# Patient Record
Sex: MALE | Race: WHITE | NOT HISPANIC OR LATINO | URBAN - METROPOLITAN AREA
[De-identification: names, ages, dates, MRNs, and addresses within clinical notes are randomized per-mention and may not be internally consistent; named-entity substitution may affect disease eponyms.]

---

## 2017-10-30 ENCOUNTER — OFFICE VISIT - RIVER FALLS (OUTPATIENT)
Dept: FAMILY MEDICINE | Facility: CLINIC | Age: 32
End: 2017-10-30

## 2017-11-16 ENCOUNTER — OFFICE VISIT - RIVER FALLS (OUTPATIENT)
Dept: FAMILY MEDICINE | Facility: CLINIC | Age: 32
End: 2017-11-16

## 2017-11-16 ASSESSMENT — MIFFLIN-ST. JEOR: SCORE: 2077.93

## 2018-01-10 ENCOUNTER — OFFICE VISIT - RIVER FALLS (OUTPATIENT)
Dept: FAMILY MEDICINE | Facility: CLINIC | Age: 33
End: 2018-01-10

## 2018-01-10 ASSESSMENT — MIFFLIN-ST. JEOR: SCORE: 2085.19

## 2018-03-14 ENCOUNTER — OFFICE VISIT - RIVER FALLS (OUTPATIENT)
Dept: FAMILY MEDICINE | Facility: CLINIC | Age: 33
End: 2018-03-14

## 2018-04-25 ENCOUNTER — AMBULATORY - RIVER FALLS (OUTPATIENT)
Dept: FAMILY MEDICINE | Facility: CLINIC | Age: 33
End: 2018-04-25

## 2018-05-29 ENCOUNTER — COMMUNICATION - RIVER FALLS (OUTPATIENT)
Dept: FAMILY MEDICINE | Facility: CLINIC | Age: 33
End: 2018-05-29

## 2018-05-29 ENCOUNTER — OFFICE VISIT - RIVER FALLS (OUTPATIENT)
Dept: FAMILY MEDICINE | Facility: CLINIC | Age: 33
End: 2018-05-29

## 2018-07-27 ENCOUNTER — OFFICE VISIT - RIVER FALLS (OUTPATIENT)
Dept: FAMILY MEDICINE | Facility: CLINIC | Age: 33
End: 2018-07-27

## 2018-09-06 ENCOUNTER — OFFICE VISIT - RIVER FALLS (OUTPATIENT)
Dept: FAMILY MEDICINE | Facility: CLINIC | Age: 33
End: 2018-09-06

## 2018-09-06 ASSESSMENT — MIFFLIN-ST. JEOR: SCORE: 2080.31

## 2018-09-12 ENCOUNTER — OFFICE VISIT - RIVER FALLS (OUTPATIENT)
Dept: FAMILY MEDICINE | Facility: CLINIC | Age: 33
End: 2018-09-12

## 2018-09-13 LAB
CHOLEST SERPL-MCNC: 144 MG/DL
CHOLEST/HDLC SERPL: 3.1 {RATIO}
CREAT SERPL-MCNC: 1.08 MG/DL (ref 0.6–1.35)
GLUCOSE BLD-MCNC: 85 MG/DL (ref 65–99)
HDLC SERPL-MCNC: 46 MG/DL
LDLC SERPL CALC-MCNC: 85 MG/DL
NONHDLC SERPL-MCNC: 98 MG/DL
TRIGL SERPL-MCNC: 46 MG/DL

## 2018-09-24 ENCOUNTER — OFFICE VISIT - RIVER FALLS (OUTPATIENT)
Dept: FAMILY MEDICINE | Facility: CLINIC | Age: 33
End: 2018-09-24

## 2018-09-24 ASSESSMENT — MIFFLIN-ST. JEOR: SCORE: 2089.38

## 2018-10-02 ENCOUNTER — AMBULATORY - RIVER FALLS (OUTPATIENT)
Dept: FAMILY MEDICINE | Facility: CLINIC | Age: 33
End: 2018-10-02

## 2018-12-19 ENCOUNTER — OFFICE VISIT - RIVER FALLS (OUTPATIENT)
Dept: FAMILY MEDICINE | Facility: CLINIC | Age: 33
End: 2018-12-19

## 2018-12-19 ASSESSMENT — MIFFLIN-ST. JEOR: SCORE: 2083.94

## 2019-09-18 ENCOUNTER — OFFICE VISIT - RIVER FALLS (OUTPATIENT)
Dept: FAMILY MEDICINE | Facility: CLINIC | Age: 34
End: 2019-09-18

## 2019-09-18 ASSESSMENT — MIFFLIN-ST. JEOR: SCORE: 2106.62

## 2019-09-19 ENCOUNTER — COMMUNICATION - RIVER FALLS (OUTPATIENT)
Dept: FAMILY MEDICINE | Facility: CLINIC | Age: 34
End: 2019-09-19

## 2019-09-19 LAB
BASOPHILS # BLD MANUAL: 43 10*3/UL (ref 0–200)
BASOPHILS NFR BLD MANUAL: 0.7 %
BUN SERPL-MCNC: 20 MG/DL (ref 7–25)
BUN/CREAT RATIO - HISTORICAL: NORMAL (ref 6–22)
CALCIUM SERPL-MCNC: 10 MG/DL (ref 8.6–10.3)
CHLORIDE BLD-SCNC: 104 MMOL/L (ref 98–110)
CHOLEST SERPL-MCNC: 159 MG/DL
CHOLEST/HDLC SERPL: 2.9 {RATIO}
CO2 SERPL-SCNC: 29 MMOL/L (ref 20–32)
CREAT SERPL-MCNC: 1.13 MG/DL (ref 0.6–1.35)
EGFRCR SERPLBLD CKD-EPI 2021: 83 ML/MIN/1.73M2
EOSINOPHIL # BLD MANUAL: 183 10*3/UL (ref 15–500)
EOSINOPHIL NFR BLD MANUAL: 3 %
ERYTHROCYTE [DISTWIDTH] IN BLOOD BY AUTOMATED COUNT: 13.3 % (ref 11–15)
GLUCOSE BLD-MCNC: 83 MG/DL (ref 65–99)
HCT VFR BLD AUTO: 41.8 % (ref 38.5–50)
HDLC SERPL-MCNC: 54 MG/DL
HGB BLD-MCNC: 14.4 GM/DL (ref 13.2–17.1)
LDLC SERPL CALC-MCNC: 91 MG/DL
LYMPHOCYTES # BLD MANUAL: 1757 10*3/UL (ref 850–3900)
LYMPHOCYTES NFR BLD MANUAL: 28.8 %
MCH RBC QN AUTO: 30.6 PG (ref 27–33)
MCHC RBC AUTO-ENTMCNC: 34.4 GM/DL (ref 32–36)
MCV RBC AUTO: 88.9 FL (ref 80–100)
MONOCYTES # BLD MANUAL: 464 10*3/UL (ref 200–950)
MONOCYTES NFR BLD MANUAL: 7.6 %
NEUTROPHILS # BLD MANUAL: 3654 10*3/UL (ref 1500–7800)
NEUTROPHILS NFR BLD MANUAL: 59.9 %
NONHDLC SERPL-MCNC: 105 MG/DL
PLATELET # BLD AUTO: 317 10*3/UL (ref 140–400)
PMV BLD: 11.3 FL (ref 7.5–12.5)
POTASSIUM BLD-SCNC: 4.6 MMOL/L (ref 3.5–5.3)
RBC # BLD AUTO: 4.7 10*6/UL (ref 4.2–5.8)
SODIUM SERPL-SCNC: 140 MMOL/L (ref 135–146)
TRIGL SERPL-MCNC: 62 MG/DL
TSH SERPL DL<=0.005 MIU/L-ACNC: 2.16 MIU/L (ref 0.4–4.5)
WBC # BLD AUTO: 6.1 10*3/UL (ref 3.8–10.8)

## 2020-01-21 ENCOUNTER — COMMUNICATION - RIVER FALLS (OUTPATIENT)
Dept: FAMILY MEDICINE | Facility: CLINIC | Age: 35
End: 2020-01-21

## 2022-02-12 VITALS
HEIGHT: 77 IN | OXYGEN SATURATION: 99 % | OXYGEN SATURATION: 99 % | WEIGHT: 232.8 LBS | DIASTOLIC BLOOD PRESSURE: 88 MMHG | HEART RATE: 71 BPM | HEIGHT: 77 IN | DIASTOLIC BLOOD PRESSURE: 96 MMHG | SYSTOLIC BLOOD PRESSURE: 130 MMHG | SYSTOLIC BLOOD PRESSURE: 146 MMHG | BODY MASS INDEX: 27.25 KG/M2 | BODY MASS INDEX: 27.49 KG/M2 | WEIGHT: 230.8 LBS | HEART RATE: 65 BPM

## 2022-02-12 VITALS
HEART RATE: 57 BPM | WEIGHT: 236.6 LBS | SYSTOLIC BLOOD PRESSURE: 142 MMHG | DIASTOLIC BLOOD PRESSURE: 88 MMHG | HEIGHT: 77 IN | BODY MASS INDEX: 27.94 KG/M2

## 2022-02-12 VITALS
BODY MASS INDEX: 27.28 KG/M2 | SYSTOLIC BLOOD PRESSURE: 132 MMHG | DIASTOLIC BLOOD PRESSURE: 84 MMHG | OXYGEN SATURATION: 97 % | WEIGHT: 231 LBS | HEIGHT: 77 IN | HEART RATE: 77 BPM

## 2022-02-12 VITALS
TEMPERATURE: 97.6 F | BODY MASS INDEX: 27.09 KG/M2 | WEIGHT: 229.4 LBS | HEIGHT: 77 IN | SYSTOLIC BLOOD PRESSURE: 134 MMHG | HEART RATE: 50 BPM | DIASTOLIC BLOOD PRESSURE: 84 MMHG

## 2022-02-12 VITALS
HEIGHT: 77 IN | WEIGHT: 231.6 LBS | BODY MASS INDEX: 27.35 KG/M2 | DIASTOLIC BLOOD PRESSURE: 76 MMHG | HEART RATE: 72 BPM | SYSTOLIC BLOOD PRESSURE: 122 MMHG

## 2022-02-16 NOTE — TELEPHONE ENCOUNTER
---------------------  From: Krzysztof Villatoro MD   To: SULAIMAN MELO    Sent: 9/19/2019 3:27:58 PM CDT  Subject: Patient Message - Results Notification        The cholesterol results are excellent.   Your other results are all OK    Results:  Date Result Name Value Ref Range   9/18/2019 8:59 AM Sodium Level 140 mmol/L (135 - 146)   9/18/2019 8:59 AM Potassium Level 4.6 mmol/L (3.5 - 5.3)   9/18/2019 8:59 AM Chloride Level 104 mmol/L (98 - 110)   9/18/2019 8:59 AM CO2 Level 29 mmol/L (20 - 32)   9/18/2019 8:59 AM Glucose Level 83 mg/dL (65 - 99)   9/18/2019 8:59 AM BUN 20 mg/dL (7 - 25)   9/18/2019 8:59 AM Creatinine Level 1.13 mg/dL (0.60 - 1.35)   9/18/2019 8:59 AM BUN/Creat Ratio NOT APPLICABLE (6 - 22)   9/18/2019 8:59 AM eGFR 83 mL/min/1.73m2 (> OR = 60 - )   9/18/2019 8:59 AM eGFR African American 96 mL/min/1.73m2 (> OR = 60 - )   9/18/2019 8:59 AM Calcium Level 10.0 mg/dL (8.6 - 10.3)   9/18/2019 8:59 AM Cholesterol 159 mg/dL ( - <200)   9/18/2019 8:59 AM Non-HDL Cholesterol 105 ( - <130)   9/18/2019 8:59 AM HDL 54 mg/dL (>40 - )   9/18/2019 8:59 AM Cholesterol/HDL Ratio 2.9 ( - <5.0)   9/18/2019 8:59 AM LDL 91    9/18/2019 8:59 AM Triglyceride 62 mg/dL ( - <150)   9/18/2019 8:59 AM TSH 2.16 mIU/L (0.40 - 4.50)   9/18/2019 8:59 AM WBC 6.1 (3.8 - 10.8)   9/18/2019 8:59 AM RBC 4.70 (4.20 - 5.80)   9/18/2019 8:59 AM Hgb 14.4 gm/dL (13.2 - 17.1)   9/18/2019 8:59 AM Hct 41.8 % (38.5 - 50.0)   9/18/2019 8:59 AM MCV 88.9 fL (80.0 - 100.0)   9/18/2019 8:59 AM MCH 30.6 pg (27.0 - 33.0)   9/18/2019 8:59 AM MCHC 34.4 gm/dL (32.0 - 36.0)   9/18/2019 8:59 AM RDW 13.3 % (11.0 - 15.0)   9/18/2019 8:59 AM Platelet 317 (140 - 400)   9/18/2019 8:59 AM MPV 11.3 fL (7.5 - 12.5)   9/18/2019 8:59 AM Lymphocytes 28.8 %    9/18/2019 8:59 AM Abs Lymphocytes 1,757 (850 - 3,900)   9/18/2019 8:59 AM Neutrophils 59.9 %    9/18/2019 8:59 AM Abs Neutrophils 3,654 (1,500 - 7,800)   9/18/2019 8:59 AM Monocytes 7.6 %    9/18/2019 8:59  AM Abs Monocytes 464 (200 - 950)   9/18/2019 8:59 AM Eosinophils 3.0 %    9/18/2019 8:59 AM Abs Eosinophils 183 (15 - 500)   9/18/2019 8:59 AM Basophils 0.7 %    9/18/2019 8:59 AM Abs Basophils 43 (0 - 200)

## 2022-02-16 NOTE — LETTER
(Inserted Image. Unable to display)     September 09, 2019      SULAIMAN MELO  [address]  [place], [place] [id number]          Dear SULAIMAN,      Thank you for selecting [facility] (previously [facility]) for your healthcare needs.      Our records indicate you are due for the following services:     Annual Physical.      To schedule an appointment or if you have further questions, please contact your primary clinic:   [facility]       [phone]   [facility]       [phone]              [facility]     [phone]      Powered by [employer]    Sincerely,    Sandeep Flood MD

## 2022-02-16 NOTE — TELEPHONE ENCOUNTER
---------------------  From: Magno Toribio   Sent: 9/18/2019 1:57:52 PM CDT  Subject: General Message     Called pt and left message for a call back. ZIM reviewed his CPAP report and everything looks good.  Pt can  his chip at the .

## 2022-02-16 NOTE — NURSING NOTE
Comprehensive Intake Entered On:  9/18/2019 7:59 AM CDT    Performed On:  9/18/2019 7:58 AM CDT by Magno Toribio               Summary   Chief Complaint :   Pt here for annual px.    Weight Measured :   236.6 lb(Converted to: 236 lb 10 oz, 107.32 kg)    Height Measured :   76.75 in(Converted to: 6 ft 5 in, 194.94 cm)    Body Mass Index :   28.24 kg/m2 (HI)    Body Surface Area :   2.41 m2   Systolic Blood Pressure :   142 mmHg (HI)    Diastolic Blood Pressure :   88 mmHg (HI)    Mean Arterial Pressure :   106 mmHg   Peripheral Pulse Rate :   57 bpm (LOW)    HR Method :   Manual   Magno Toribio - 9/18/2019 7:58 AM CDT   Health Status   Allergies Verified? :   Yes   Medication History Verified? :   Yes   Medical History Verified? :   Yes   Pre-Visit Planning Status :   N/A   Magno Toribio - 9/18/2019 7:58 AM CDT   Consents   Consent for Immunization Exchange :   Consent Granted   Consent for Immunizations to Providers :   Consent Granted   Magno Toribio - 9/18/2019 7:58 AM CDT   Meds / Allergies   (As Of: 9/18/2019 7:59:53 AM CDT)   Allergies (Active)   amoxicillin  Estimated Onset Date:   Unspecified ; Created By:   Mary Torres CMA; Reaction Status:   Active ; Category:   Drug ; Substance:   amoxicillin ; Type:   Allergy ; Updated By:   Mary Torres CMA; Reviewed Date:   9/18/2019 7:59 AM CDT        Medication List   (As Of: 9/18/2019 7:59:53 AM CDT)   Prescription/Discharge Order    Miscellaneous Rx Supply  :   Miscellaneous Rx Supply ; Status:   Prescribed ; Ordered As Mnemonic:   Auto-Titrating CPAP 6-16 ; Simple Display Line:   See Instructions, Heated humidifier, heated tubing, filters, nasal or full face mask of choice with headgear.  Replacement cushions and supplies as needed.  Months = 99 (Lifetime), 1 EA, 0 Refill(s) ; Ordering Provider:   Krzysztof Villatoro MD; Catalog Code:   Miscellaneous Rx Supply ; Order Dt/Tm:   9/24/2018 4:18:06 PM             Home Meds    fluticasone nasal  :   fluticasone nasal ; Status:   Documented ; Ordered As Mnemonic:   Flonase 50 mcg/inh nasal spray ; Simple Display Line:   1 spray(s), nasal, daily, 0 Refill(s) ; Catalog Code:   fluticasone nasal ; Order Dt/Tm:   8/29/2016 8:54:19 AM          multivitamin with minerals  :   multivitamin with minerals ; Status:   Documented ; Ordered As Mnemonic:   Daily Multi oral tablet ; Simple Display Line:   1 tab(s), po, daily ; Catalog Code:   multivitamin with minerals ; Order Dt/Tm:   8/26/2014 8:55:35 AM

## 2022-02-16 NOTE — PROGRESS NOTES
Patient:   SULAIMAN MELO            MRN: [id number]            FIN: [id number]               Age:   33 years     Sex:  Male     :  1985   Associated Diagnoses:   Mild obstructive sleep apnea; Obesity   Author:   Krzysztof Villatoro MD      Chief Complaint   2018 4:26 PM CST   2month cpap compliance      History of Present Illness   See chief complaint as noted above and confirmed with the patient   33 year old patient who presents today for follow up of their obstructive sleep apnea.    Original Test Date:  18  Results/ AHI:  6.1  Machine Type:  [employer]Med Auto set  Compliance:  Uses regularly and finds benefit. His blood pressure has been improving since starting. He struggled the first week after starting but has adjusted well. He feels more alert at work and thinking a little more clear. He has noticed that his nose is more congested. He has spring allergies where he takes Flonase but he doesn't take currently.      Review of Systems   Constitutional:  No fever, No chills, No fatigue.    Ear/Nose/Mouth/Throat:  Nasal congestion.    Respiratory:  Sleep apnea.         Device use: CPAP.    Cardiovascular:  No chest pain.    Gastrointestinal:  No nausea, No vomiting.    Musculoskeletal:  No back pain.    Integumentary:  No rash.    Neurologic:  No headache.    Psychiatric:  Sleeping problems, No anxiety.              Health Status   Allergies:    Allergic Reactions (Selected)  Severity Not Documented  Amoxicillin (No reactions were documented)   Medications:  (Selected)   Prescriptions  Prescribed  Auto-Titrating CPAP 6-16: Auto-Titrating CPAP 6-16, See Instructions, Instructions: Heated humidifier, heated tubing, filters, nasal or full face mask of choice with headgear.  Replacement cushions and supplies as needed.  Months = 99 (Lifetime), Supply, # 1 EA, 0 Refill(s), T...  Documented Medications  Documented  Daily Multi oral tablet: 1 tab(s), po, daily, 0 Refill(s), Type: Maintenance  Flonase 50  mcg/inh nasal spray: 1 spray(s), nasal, daily, 0 Refill(s), Type: Maintenance   Problem list:    All Problems  Obesity / SNOMED CT 9845852742 / Probable  Resolved: No pertinent past medical history / ICD-9-CM V49.9      Histories   Past Medical History:    Active  Obesity ([id number])  Resolved  No pertinent past medical history (V49.9):  Resolved.   Family History:    Hypertension  Mother (Latrice)  Epilepsy  Father (Mekhi)     Procedure history:    Vasectomy ([id number]) on 3/14/2018 at 32 Years.  None ([id number]).   Social History:        Alcohol Assessment            Current, 1-2 times per week, 2 drinks/episode average.  4 drinks/episode maximum.      Tobacco Assessment            Never      Substance Abuse Assessment            Never      Employment and Education Assessment            Employed, Work/School description: .      Home and Environment Assessment            Marital status: .  Spouse/Partner name: Leila.  Lives with Children.  2 children.      Exercise and Physical Activity Assessment            Exercise frequency: 5-6 times/week.  Exercise type: Running, Nickie LAURAD.                     Comments:                      07/31/2012 - Bing Carr CMA                     Push-ups, sit-ups, curls, jogging      Physical Examination   Vital Signs   12/19/2018 4:26 PM CST Peripheral Pulse Rate 72 bpm    Systolic Blood Pressure 122 mmHg    Diastolic Blood Pressure 76 mmHg    Mean Arterial Pressure 91 mmHg      Measurements from flowsheet : Measurements   12/19/2018 4:26 PM CST Height Measured - Standard 76.75 in    Weight Measured - Standard 231.6 lb    BSA 2.38 m2    Body Mass Index 27.64 kg/m2  HI      General:  Alert and oriented, No acute distress.    Eye:  Pupils are equal, round and reactive to light, Normal conjunctiva.    HENT:  Oral mucosa is moist.    Neck:  Supple.    Respiratory:  Respirations are non-labored.    Cardiovascular:  Normal rate, Regular  rhythm, No edema.    Gastrointestinal:  Non-distended.    Musculoskeletal:  Normal gait.    Integumentary:  Warm, No rash.    Psychiatric:  Cooperative, Appropriate mood & affect, Normal judgment.       Review / Management   Results review      Impression and Plan   Diagnosis     Mild obstructive sleep apnea (IKP94-OG G47.33).     Obesity (LBW00-NR E66.9).     Plan:  Patient reports good compliance.  Download results:  84%  Patient compliantly using & benefitting from CPAP and should continue to use CPAP therapy as directed. Discussed continue the  flonase all year round or changing the humidity settings may help the congestion.  Patient plans to continue using CPAP.  Encouraged to keep equipment up to date and consider yearly downloads.  [facility], acted solely as a scribe for, and in the presence of Dr. Krzysztof Villatoro who performed the service..

## 2022-02-16 NOTE — LETTER
(Inserted Image. Unable to display)     December 20, 2019      SULAIMAN MELO  [address]   [place], [place] [id number]          Dear SULAIMAN,      Thank you for selecting [facility] (previously [facility] for your healthcare needs.      Our records indicate you are due for the following services:     Sleep Apnea Follow up ~ It is recommended and possibly required by your insurance to see one of our sleep physicians, Dr. Talat Henry or Dr. Chuck Villatoro, 30-90 days after starting treatment (PAP therapy) for sleep apnea.  To determine whether you are benefiting from PAP therapy, a download of your machine will be needed. We may be able to obtain the download remotely; however, to ensure this information will be available for your visit, please bring your CPAP machine SD card to your visit.    Appointments with our sleep physicians can be made by calling your primary clinic.        To schedule an appointment or if you have further questions, please contact your primary clinic:   Critical access hospital       [phone]   [facility]       [phone]              [facility]     [phone]      Powered by [employer]    Sincerely,    Krzysztof Villatoro MD

## 2022-02-16 NOTE — PROGRESS NOTES
Patient:   SULAIMAN MELO            MRN: [id number]            FIN: [id number]               Age:   32 years     Sex:  Male     :  1985   Associated Diagnoses:   Annual physical exam; Generalized anxiety disorder   Author:   Sandeep Flood MD      Chief Complaint   2017 8:57 AM CST   Physical, flu shot      Well Adult History   Weight down 12 lbs from last year.  Does not feel depressed.  Anxiety manageable and wife not as bothered by it and they talk about it. Not letting little things get to him as much. Patience is better. Has been always a little anxious.  Moved into new house 2017.      Review of Systems   Constitutional:  No fever, No weakness, No fatigue.    Eye:  No recent visual problem.    Ear/Nose/Mouth/Throat:  No ear pain, No tinnitus.    Respiratory:  No shortness of breath, No cough, No wheezing.    Cardiovascular:  No chest pain, No peripheral edema.    Gastrointestinal:  feels has hemorrhoids, No vomiting.    Genitourinary:  No dysuria, No change in urine stream.    Hematology/Lymphatics:  No bruising tendency, No bleeding tendency.    Endocrine:  Negative.    Immunologic:  Negative.    Musculoskeletal:  No joint pain, No decreased range of motion.    Integumentary:  No rash.    Neurologic:  No numbness, No headache.    PHQ-9: 5pts (2017). 5pts (2016). 3pts (2015);  8pts (2014); 5pts ()   All other systems reviewed and negative   JULIAN-7: 5pts (2016). 9pts ()  MDQ: 5pts and no problem  Needs sleep  CAGE: 0pts and 5-10 drinks a week  Gas pains eating pasta 1-2x a month      Health Status   Allergies:    Allergic Reactions (Selected)  Severity Not Documented  Amoxicillin (No reactions were documented)   Medications:  (Selected)   Documented Medications  Documented  Daily Multi oral tablet: 1 tab(s), po, daily, 0 Refill(s), Type: Maintenance  Flonase 50 mcg/inh nasal spray: 1 spray(s), nasal, daily, 0 Refill(s), Type: Maintenance    Problem list:    All Problems  Obesity / ICD-9-.00 / Probable  Resolved: No pertinent past medical history / ICD-9-CM V49.9      Histories   Family History: Mom with HTN. Dad with seizure disorder 2015   Procedure history:    None (SNOMED CT 861404892).   Social History:  2008 (Leila) going well. Wife works as guidance counselor at [employer].  New job Nov 2014 [employer] (had worked at home in past - Experience ). nonsmoker. 5-10 drinks a week; Graduated [employer]. Son is 3 and daughter is 6.      Physical Examination   Vital Signs   11/16/2017 9:32 AM CST Systolic Blood Pressure 134 mmHg    Diastolic Blood Pressure 84 mmHg   11/16/2017 8:57 AM CST Temperature Tympanic 97.6 DegF  LOW    Peripheral Pulse Rate 50 bpm  LOW      Measurements from flowsheet : Measurements   11/16/2017 8:57 AM CST Height Measured - Standard 77 in    Weight Measured - Standard 229.4 lb    BSA 2.38 m2    Body Mass Index 27.2 kg/m2      General:  Alert and oriented, No acute distress.    Eye:  Normal conjunctiva.    HENT:  Tympanic membranes are clear, No pharyngeal erythema.    Neck:  No lymphadenopathy, No thyromegaly.    Respiratory:  Lungs are clear to auscultation.    Cardiovascular:  Normal rate, Regular rhythm, No murmur, No edema.    Gastrointestinal:  Soft, Non-tender, Non-distended.    Genitourinary:  defers.    Musculoskeletal:  Normal range of motion, Normal strength, No tenderness, Normal gait.    Integumentary:  Warm, No rash.    Neurologic:  Alert, Oriented, Normal sensory, Normal motor function, No focal deficits, Normal deep tendon reflexes.    Psychiatric:  Cooperative, Appropriate mood & affect.       Impression and Plan   Diagnosis     Annual physical exam (USF72-TN Z00.00).     Generalized anxiety disorder (MBR86-PH F41.1).       Blood pressure: borderline and will continue to monitor  Cardiac: Cholesterol well controlled and recheck 2018  Cancer Screen: no early family  history of colon or prostate cancer  Fatigue: has possibly some periodic limb movements at night. Restless. Sometimes up for an hour at night. Recommend consult sleep physician in clinic  Immunizations: Td 2006. Adacel 2016  Anxiety: managing on own and doing well  Weight: congratulated and encouraged to eventually get down to 210 lbs  Considering vasectomy

## 2022-02-16 NOTE — PROGRESS NOTES
Patient:   SULAIMAN MELO            MRN: [id number]            FIN: [id number]               Age:   33 years     Sex:  Male     :  1985   Associated Diagnoses:   Well adult exam; Fatigue; Excessive daytime sleepiness; Excessive gas   Author:   Krzysztof Villatoro MD      Chief Complaint   2018 4:28 PM CDT     Here for annual physical also discuss sleep test, does not snore but has periods of heavy breathing and periods, also would like flu shot      History of Present Illness   see chief complaint as noted above and confirmed with the patient     33 year old male presents today for a yearly physical exam.     Sleep: He feels tired and fatigued, he mentioned it a few years ago to a provider and was told to have his wife watch him sleep. She informed him that he does not snore but he goes through episodes of  heavy breathing and then episodes where she is not sure if he is breathing at all.     Vasectomy: He had a vasectomy 3/14/18, he has brought back three sperm specimens and they have come back with rare sperm shown, he questions what he should be doing.      Gas: He has gas that gets caught up in his shoulder, he says it is hard to get out and is painful. He notices when he eats noodles the gas is worse.     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       Review of Systems   Constitutional:  Fatigue, No fever, No weakness, No decreased activity.    Eye:  No blurring, No double vision.    Ear/Nose/Mouth/Throat:  No nasal congestion, No sore throat.    Respiratory:  No shortness of breath, No cough, No wheezing.    Cardiovascular:  No chest pain, No palpitations.    Gastrointestinal:  No nausea, No vomiting, No diarrhea, No abdominal pain.    Genitourinary:  No dysuria, No hematuria.    Musculoskeletal:  No back pain.    Integumentary:  No rash.    Neurologic:  Alert and oriented X4, No headache.    Psychiatric:  No anxiety,  No depression.       Health Status   Allergies:    Allergic Reactions (Selected)  Severity Not Documented  Amoxicillin (No reactions were documented)   Medications:  (Selected)   Documented Medications  Documented  Daily Multi oral tablet: 1 tab(s), po, daily, 0 Refill(s), Type: Maintenance  Flonase 50 mcg/inh nasal spray: 1 spray(s), nasal, daily, 0 Refill(s), Type: Maintenance   Problem list:    All Problems  Obesity / SNOMED CT 4993958686 / Probable  Resolved: No pertinent past medical history / ICD-9-CM V49.9      Histories   Past Medical History:    Active  Obesity ([id number])  Resolved  No pertinent past medical history (V49.9):  Resolved.   Family History:    Hypertension  Mother (Latrice)  Epilepsy  Father (Mekhi)     Procedure history:    None (059950072).   Social History:        Alcohol Assessment            Current, 1-2 times per week, 2 drinks/episode average.  4 drinks/episode maximum.      Tobacco Assessment            Never      Substance Abuse Assessment            Never      Employment and Education Assessment            Employed, Work/School description: .      Home and Environment Assessment            Marital status: .  Spouse/Partner name: Leila.  Lives with Children.  2 children.      Exercise and Physical Activity Assessment            Exercise frequency: 5-6 times/week.  Exercise type: Running, Nickie Mark DVD.                     Comments:                      07/31/2012 - Bing Carr CMA                     Push-ups, sit-ups, curls, jogging        Physical Examination   Vital Signs   9/6/2018 4:28 PM CDT Peripheral Pulse Rate 65 bpm    Pulse Site Radial artery    Systolic Blood Pressure 130 mmHg    Diastolic Blood Pressure 88 mmHg  HI    Mean Arterial Pressure 102 mmHg    BP Site Right arm    Oxygen Saturation 99 %      Measurements from flowsheet : Measurements   9/6/2018 4:28 PM CDT Height Measured - Standard 76.75 in    Weight Measured -  Standard 230.8 lb    BSA 2.38 m2    Body Mass Index 27.54 kg/m2  HI      General:  Alert and oriented, No acute distress.    Eye:  Pupils are equal, round and reactive to light, Normal conjunctiva.    HENT:  Normocephalic, Tympanic membranes are clear, Oral mucosa is moist, No pharyngeal erythema.    Neck:  Supple, Non-tender, No lymphadenopathy.    Respiratory:  Lungs are clear to auscultation, Respirations are non-labored.    Cardiovascular:  Normal rate, Regular rhythm, No edema.    Gastrointestinal:  Soft, Non-tender, Non-distended, No organomegaly.    Musculoskeletal:  Normal range of motion, Normal strength, No swelling, Normal gait.    Integumentary:  Warm, No rash.    Neurologic:  Alert, Oriented.    Psychiatric:  Cooperative, Appropriate mood & affect, Normal judgment.       Review / Management   Results review      Impression and Plan       Diagnosis     Well adult exam (XDQ53-DL Z00.00).     Fatigue (UCA07-LZ R53.83).     Excessive daytime sleepiness (IZB24-JM G47.19).     Excessive gas (XCV50-DI R14.3).     Plan:  If you would like we can order a semen analysis-this would need to be done at a different facility where the sample would need to be given at the clinic and then the testing would be done, if you decide to do this can give us a call and we would place order and our refferals department would give you a call to help get this set up.     Flu vaccine given today     Return for fasting lab work, need to make a lab only appointment for the lab work, orders have been placed in your chart.     If you feel the gas is bad you can try to use Miralax for a few days to help get the stool out and help pass the gas, can also try Gas X if you have gas. .    Summary:  Ordered home sleep test     Encouraged to have daily stretching and healthy eating habits along with physical activity.     Discussed rare sperm found in Semen samples after vasectomy-he will discuss with wife and if he decides he would like to  do further testing then he would need a semen analysis.     Flu vaccine given today.     Discussed a few over the counter medications he could try to help with the gas. Offered Gluten testing- he is not ready to do that at this time. .    I, Audrey Bhardwaj Medical Assistant acted solely as a scribe for, and in presence of Dr. Krzysztof Villatoro who performed the services.

## 2022-02-16 NOTE — PROGRESS NOTES
Patient:   SULAIMAN MELO            MRN: [id number]            FIN: [id number]               Age:   33 years     Sex:  Male     :  1985   Associated Diagnoses:   Mild obstructive sleep apnea   Author:   Krzysztof Villatoro MD      Chief Complaint   2018 3:52 PM CDT    Follow up home sleep test      History of Present Illness   33 year old patient presents for follow up after having a home sleep test done on 2018.      Reason for test: Waking up often through the night, poor sleep, feeling fatigued and tired throughout the day.     Results / AHI: AHI: 5.5 with lowest oxygen saturation of 89%.    CPAP Status: Has not been set up with c-pap yet.       Review of Systems   Constitutional:  Fatigue, Decreased activity.    Ear/Nose/Mouth/Throat:  No nasal congestion.    Respiratory:  No shortness of breath, No cough.    Cardiovascular:  No palpitations.    Gastrointestinal:  No nausea.    Integumentary:  No rash.       Health Status   Allergies:    Allergic Reactions (Selected)  Severity Not Documented  Amoxicillin (No reactions were documented)   Medications:  (Selected)   Documented Medications  Documented  Daily Multi oral tablet: 1 tab(s), po, daily, 0 Refill(s), Type: Maintenance  Flonase 50 mcg/inh nasal spray: 1 spray(s), nasal, daily, 0 Refill(s), Type: Maintenance   Problem list:    All Problems  Obesity / SNOMED CT 1924644714 / Probable  Resolved: No pertinent past medical history / ICD-9-CM V49.9      Histories   Past Medical History:    Active  Obesity (0751504776)  Resolved  No pertinent past medical history (V49.9):  Resolved.   Family History:    Hypertension  Mother (Latrice)  Epilepsy  Father (Mekhi)     Procedure history:    None ([id number]).   Social History:        Alcohol Assessment            Current, 1-2 times per week, 2 drinks/episode average.  4 drinks/episode maximum.      Tobacco Assessment            Never      Substance Abuse Assessment            Never      Employment and  "Education Assessment            Employed, Work/School description: .      Home and Environment Assessment            Marital status: .  Spouse/Partner name: Leila.  Lives with Children.  2 children.      Exercise and Physical Activity Assessment            Exercise frequency: 5-6 times/week.  Exercise type: Sofia, Nickie LAURAD.                     Comments:                      07/31/2012 - Bruno LIZAMA, Bing                     Push-ups, sit-ups, curls, jogging        Physical Examination   Vital Signs   9/24/2018 3:52 PM CDT Peripheral Pulse Rate 71 bpm    Pulse Site Radial artery    Systolic Blood Pressure 146 mmHg  HI    Diastolic Blood Pressure 96 mmHg  HI    Mean Arterial Pressure 113 mmHg    BP Site Right arm    Oxygen Saturation 99 %      Measurements from flowsheet : Measurements   9/24/2018 3:52 PM CDT Height Measured - Standard 76.75 in    Weight Measured - Standard 232.8 lb    BSA 2.39 m2    Body Mass Index 27.78 kg/m2  HI      General:  Alert and oriented, No acute distress.    Eye:  Pupils are equal, round and reactive to light, Normal conjunctiva.    HENT:  Oral mucosa is moist.    Neck:  Supple.    Respiratory:  Respirations are non-labored.    Cardiovascular:  Normal rate, Regular rhythm, No edema.    Gastrointestinal:  Non-distended.    Musculoskeletal:  Normal gait.    Integumentary:  Warm, No rash.    Neurologic:  Alert, Oriented.    Psychiatric:  Cooperative, Appropriate mood & affect, Normal judgment.       Impression and Plan   Diagnosis     Mild obstructive sleep apnea (HRW74-EU G47.33).     Course:  Reviewed patients study and its significance., mild LIZA but with hypertension and significant daytime sleepiness.    Plan:   , Home sleep test reveals Mild Sleep Apnea, AHI waa \"5.5\", this is in the mild sleep apnea range, lowest oxygen level reading was 89%.     Will send a message to person here at the clinic who deals with the c-pap companies, " she will give you a call and get you set up with a company who will give you the c-pap machine and supplies.     We ask that after you start c-pap you return in two months after using it. .    Summary:  Patient with obstructive sleep apnea.  Discussed overall effects of LIZA as well as goals of therapy and obstacles to therapy. Treatment options are discussed.  Patient agrees to try PAP therapy and has been referred for set up.  Reviewed the importance of CPAP adherence to reduce daytime sleepiness and prevent excess mortality associated with sleep apnea  Will follow up in approximately 2 months to determine effectiveness of therapy.  .

## 2022-02-16 NOTE — PROGRESS NOTES
Chief Complaint    Pt here for annual px.  History of Present Illness      34-year-old here to discuss his health.       Patient feels like he is doing very well overall.  He works out 3-5 times a week.  He is a member at the [employer] at [place].  He does some weightlifting and some aerobic work.  He has not had any injuries or limitations to his workouts.       He has sleep apnea and is been on CPAP for the past year.  Download shows excellent compliance with small leaks and a minimal AHI on a pressure of about 7 cm.  Machine is set for auto titrating.  He does state sometimes if he gets up in the middle the night he has trouble continuing on the CPAP just cannot go back to sleep with it       He has had the flu shot his last labs were a year ago.       He has had some seasonal allergies but they have been well controlled with some Flonase  Review of Systems      See HPI.  All other review of systems negative.  Except for some occasional heartburn symptoms  Physical Exam   Vitals & Measurements    HR: 57(Peripheral)  BP: 142/88     HT: 76.75 in  WT: 236.6 lb  BMI: 28.24       Alert and oriented       PERRLA EOMI white sclera        Neck is supple with no adenopathy or thyromegaly        Lungs are clear to auscultation        Heart regular rate and rhythm and without any murmurs        Abdomen is soft without hepatosplenomegaly        Normal peripheral perfusion and no rashes and no edema  Assessment/Plan       1. Mild obstructive sleep apnea (G47.33)         Discussed his CPAP usage reminded him why he uses it.  Discussed how he might be able to continue when she wakes up at night.       2. Physical exam (Z00.00)         Immunizations are up-to-date        Continue his regular exercise program with varied activities       Elevated blood pressure reading (R03.0)         His blood pressure was higher than expected today.  He is going to obtain a blood pressure cuff and check his blood pressure multiple times over the next  2 weeks and let me know what his results are.  His goal would be less than 140/90       Orders:         influenza virus vaccine, inactivated, 0.5 mL, IM, once, (Completed)         35989 imadm prq id subq/im njxs 1 vaccine (Charge), Quantity: 1, Immunization due         41735 influenza vac 4 valent prsrv free 3 yrs plus im (Charge), Quantity: 1, Immunization due         Basic Metabolic Panel* ([facility]), Specimen Type: Serum, Collection Date: 09/18/19 8:14:00 CDT         CBC (includes diff/plt)* ([facility]), Specimen Type: Blood, Collection Date: 09/18/19 8:14:00 CDT         Lipid panel with reflex to direct ldl* ([facility]), Specimen Type: Serum, Collection Date: 09/18/19 8:14:00 CDT         Return to Clinic (Request), RFV: Yearly exam/physical, Return in 1 year         Return to Clinic (Request), RFV: annual exam, Return in 1 year         TSH* ([facility]), Specimen Type: Serum, Collection Date: 09/18/19 8:14:00 CDT  Patient Information     Name:SULAIMAN MELO      Address:      [address] [place] [id number]     Sex:Male     YOB: 1985     [phone]     Emergency Contact:JORDAN MELO     MRN:[id number]     FIN:[id number]     Location:[facility]     Date of Service:09/18/2019      Primary Care Physician:       Sandeep Flood MD, [phone]      Attending Physician:       Krzysztof Villatoro MD, [phone]  Problem List/Past Medical History    Ongoing     Mild obstructive sleep apnea     Obesity    Historical     No pertinent past medical history  Procedure/Surgical History     Polysomnogram (09/12/2018)      Comments: AHI: 6.1.     Vasectomy (03/14/2018)     None  Medications    Auto-Titrating CPAP 6-16, See Instructions    Daily Multi oral tablet, 1 tab(s), Oral, daily    Flonase 50 mcg/inh nasal spray, 1 spray(s), Nasal, daily  Allergies    amoxicillin  Social History    Smoking Status - 12/19/2018     Never smoker     Alcohol      Current, 1-2 times per week, 2  drinks/episode average. 4 drinks/episode maximum., 11/17/2017     Employment/School      Employed, Work/School description: ., 09/23/2015     Exercise      Exercise frequency: 5-6 times/week. Exercise type: Nickie BlackD., 07/30/2013     Home/Environment      Marital status: . Spouse/Partner name: Leila. Lives with Children. 2 children., 09/23/2015     Substance Abuse      Never, 07/31/2012     Tobacco      Never, 08/26/2014  Family History    Epilepsy: Father.    Heart: Negative: Mother and Father.    Hypertension: Mother.  Immunizations      Vaccine Date Status      influenza virus vaccine, inactivated 09/18/2019 Given      influenza virus vaccine, inactivated 09/06/2018 Given      influenza virus vaccine, inactivated 11/16/2017 Given      tetanus/diphth/pertuss (Tdap) adult/adol 08/29/2016 Given      influenza virus vaccine, inactivated 08/29/2016 Given      influenza virus vaccine, inactivated 10/05/2015 Recorded      influenza virus vaccine, inactivated 10/28/2014 Given      influenza virus vaccine, inactivated 10/10/2013 Given

## 2022-02-16 NOTE — PROGRESS NOTES
Patient:   SULAIMAN MELO            MRN: [id number]            FIN: [id number]               Age:   32 years     Sex:  Male     :  1985   Associated Diagnoses:   Vasectomy evaluation   Author:   Krzysztof Villatoro MD      Chief Complaint   1/10/2018 3:34 PM CST    pt here for vasectomy consult        Subjective   Chief complaint 1/10/2018 3:34 PM CST    pt here for vasectomy consult  .     2 children,  he and wife agree      Health Status   Allergies:    Allergic Reactions (Selected)  Severity Not Documented  Amoxicillin (No reactions were documented)   Medications:  (Selected)   Documented Medications  Documented  Daily Multi oral tablet: 1 tab(s), po, daily, 0 Refill(s), Type: Maintenance  Flonase 50 mcg/inh nasal spray: 1 spray(s), nasal, daily, 0 Refill(s), Type: Maintenance   Problem list:    All Problems (Selected)  Obesity / [id number] / Probable      Objective   Vital Signs   1/10/2018 3:34 PM CST Peripheral Pulse Rate 77 bpm    Pulse Site Radial artery    Systolic Blood Pressure 132 mmHg  HI    Diastolic Blood Pressure 84 mmHg  HI    Mean Arterial Pressure 100 mmHg    BP Site Right arm    Oxygen Saturation 97 %      Measurements from flowsheet : Measurements   1/10/2018 3:34 PM CST Height Measured - Standard 77 in    Weight Measured - Standard 231 lb    BSA 2.38 m2    Body Mass Index 27.39 kg/m2  HI      General:  Alert and oriented, No acute distress.    Genitourinary:  No costovertebral angle tenderness, No scrotal tenderness, No inguinal tenderness, No urethral discharge, No lesions.    Psychiatric:  Cooperative, Appropriate mood & affect, Normal judgment.       Impression and Plan   Assessment and Plan:          Diagnosis: Vasectomy evaluation ([id number] Z30.09).         Course: desires vasectomy,  understands it is permanent  is aware of risks of procedure   aware of need for rest after procedure  aware of need for confirmatory testing and protection till confirmed  given brochure to review  and to call with questions  will schedule at his desire.

## 2022-02-16 NOTE — NURSING NOTE
LDVM @ 0815 informing patient we still have his cpap chip at the  for . Will recheck in one month.Called and JEFRY at 11:45am informing pt that we still have pt's CPAP chip at the  for him to . Rather not mail it in case it gets lost. Advised pt to call with any questions or stop at the  and please pick it up.

## 2022-02-16 NOTE — LETTER
(Inserted Image. Unable to display)   September 30, 2020        SULAIMAN MELO  [address]  [place], [place] [id number]        Dear SULAIMAN,      Thank you for selecting [facility] for your healthcare needs.    Our records indicate you are due for the following services:     Annual Physical    To schedule an appointment or if you have further questions, please contact your primary clinic:   [facility]       [phone]   [facility]       [phone]              [facility]     [phone]      Powered by [employer]    Sincerely,    Krzysztof Villatoro MD

## 2022-02-16 NOTE — PROGRESS NOTES
Patient:   SULAIMAN MELO            MRN: [id number]            FIN: [id number]               Age:   32 years     Sex:  Male     :  1985   Associated Diagnoses:   Encounter for vasectomy   Author:   Krzysztof Villatoro MD      Physical Examination   Genitourinary:  No costovertebral angle tenderness, No scrotal tenderness, No inguinal tenderness, No urethral discharge.       Procedure   Bilateral Vasectomy Procedure   Date:  3/14/2018.     Performed by: self.     Informed consent: signed by patient.     Indication: undesired fertility.     Preparation and technique: patient position (supine, with safety measures), sterile preparation, vasectomy technique (cords infiltrated with 1% xylocaine, vasa isolated through bilateral quarter inch scrotal openings and divided, segment excised and the proximal and distal lumens were coagulated, proximal vas deferens isolated from the distal vas deferens using the perivasal sheath and hemoclip, lumen fulgurated, hemostasis achieved), Procedure tolerated (well, without complications).     Procedure performed: Bilateral Vasectomy.        Impression and Plan   Diagnosis     Encounter for vasectomy (SIG32-FT Z30.2).     Orders     given note for follow up specimens and what to expect.